# Patient Record
Sex: MALE | Race: WHITE | HISPANIC OR LATINO | URBAN - METROPOLITAN AREA
[De-identification: names, ages, dates, MRNs, and addresses within clinical notes are randomized per-mention and may not be internally consistent; named-entity substitution may affect disease eponyms.]

---

## 2019-07-26 ENCOUNTER — EMERGENCY (EMERGENCY)
Facility: HOSPITAL | Age: 24
LOS: 1 days | Discharge: HOME | End: 2019-07-26
Payer: COMMERCIAL

## 2019-07-26 VITALS
OXYGEN SATURATION: 98 % | SYSTOLIC BLOOD PRESSURE: 133 MMHG | HEART RATE: 66 BPM | DIASTOLIC BLOOD PRESSURE: 72 MMHG | RESPIRATION RATE: 16 BRPM

## 2019-07-26 VITALS
SYSTOLIC BLOOD PRESSURE: 140 MMHG | WEIGHT: 175.05 LBS | TEMPERATURE: 98 F | HEIGHT: 72 IN | HEART RATE: 61 BPM | RESPIRATION RATE: 20 BRPM | OXYGEN SATURATION: 98 % | DIASTOLIC BLOOD PRESSURE: 77 MMHG

## 2019-07-26 PROCEDURE — 99283 EMERGENCY DEPT VISIT LOW MDM: CPT

## 2019-07-26 PROCEDURE — 73564 X-RAY EXAM KNEE 4 OR MORE: CPT

## 2019-07-26 PROCEDURE — 73564 X-RAY EXAM KNEE 4 OR MORE: CPT | Mod: 26,RT

## 2019-07-26 RX ORDER — ACETAMINOPHEN 500 MG
975 TABLET ORAL ONCE
Refills: 0 | Status: COMPLETED | OUTPATIENT
Start: 2019-07-26 | End: 2019-07-26

## 2019-07-26 RX ORDER — IBUPROFEN 200 MG
600 TABLET ORAL ONCE
Refills: 0 | Status: COMPLETED | OUTPATIENT
Start: 2019-07-26 | End: 2019-07-26

## 2019-07-26 RX ADMIN — Medication 975 MILLIGRAM(S): at 16:15

## 2019-07-26 RX ADMIN — Medication 600 MILLIGRAM(S): at 16:45

## 2019-07-26 RX ADMIN — Medication 600 MILLIGRAM(S): at 16:15

## 2019-07-26 RX ADMIN — Medication 975 MILLIGRAM(S): at 16:45

## 2019-07-26 NOTE — ED PROVIDER NOTE - NSFOLLOWUPCLINICS_GEN_ALL_ED_FT
[facility]  Sports Medicine  [address] [place] [address]  Phone: [phone]  Fax:   Follow Up Time:

## 2019-07-26 NOTE — ED PROVIDER NOTE - ATTENDING CONTRIBUTION TO CARE
MD Egan:  patient seen and evaluated with the resident.  I was present for key portions of the History & Physical, and I agree with the Impression & Plan.  MD Egan:  24 yo M, c/o R knee pain.  Onset: yesterday.  Context:  inversion/twist mechanism.  Location of pain: lateral joint line.  Pain worse with ambulation  Physical Exam: +effusion, no ligamentous laxity.  ROM limited in extremes of flexion and extension  Impression:  lateral meniscus vs lateral meniscus injury with small traumatic effusion.  Plan: d/c home, ace wrap, NSAIDs, WBAT, f/u ortho/sport.

## 2019-07-26 NOTE — ED ADULT NURSE NOTE - NSIMPLEMENTINTERV_GEN_ALL_ED
Implemented All Universal Safety Interventions:  [place] to call system. Call bell, personal items and telephone within reach. Instruct patient to call for assistance. Room bathroom lighting operational. Non-slip footwear when patient is off stretcher. Physically safe environment: no spills, clutter or unnecessary equipment. Stretcher in lowest position, wheels locked, appropriate side rails in place.

## 2019-07-26 NOTE — ED ADULT NURSE NOTE - OBJECTIVE STATEMENT
22 y/o male seen in [facility] c/o right knee injury about 22:00 last night playing soccer. Bumped into another player and knee bent inwards.  Pt c/o pain and swelling since injury. Ambulatory but with s pain.  No fever/chills or skin changes. No head injury.  Rt knee no redness, pain with movement.  Lacey cute respiratory distress noted. 24 y/o male seen in [facility] c/o right knee injury about 22:00 last night playing soccer. Bumped into another player and knee bent inwards.  Pt c/o pain and swelling since injury. Ambulatory but with s pain.  No fever/chills or skin changes. No head injury.  Rt knee no redness, pain with movement.  No acute respiratory distress noted.

## 2019-07-26 NOTE — ED PROVIDER NOTE - CLINICAL SUMMARY MEDICAL DECISION MAKING FREE TEXT BOX
[place] PGY2- R knee injury playing soccer last night, non contact, knee with bent inwards, hx of past lateral meniscal injury on affected side, no other injuries  ambulatory, but with significant pain, no fever/chills, otherwise feels well  +effusion, ttp laterally, pain with extremes on ROM, joint stable, distal neurovascular intact  XRAY, oral meds, ice, bulky underwood/crutches  sports med f/u  concern for meniscal/ligamentous injury

## 2019-07-26 NOTE — ED PROVIDER NOTE - OBJECTIVE STATEMENT
23 yoM, healthy presents to ED with complaint of R knee injury around 22:00 last night playing soccer. Bumped into another player and knee bent inwards. Now with pain to lateral portion of knee. Hx of prior lateral meniscal injury on that side. Reports significant pain and swelling since injury. Ambulatory but with significant pain. Has not taken any oral medications at home. No fever/chills or skin changes. Otherwise feels well. No AC medications. No other injuries from incident.

## 2019-07-26 NOTE — ED PROVIDER NOTE - PHYSICAL EXAMINATION
PHYSICAL EXAM:  GENERAL: NAD, well-groomed, well-developed  HEAD:  Atraumatic, Normocephalic  EYES: EOMI, PERRLA, conjunctiva and sclera clear  ENMT: No tonsillar erythema, exudates, or enlargement; Moist mucous membranes  NECK: Supple, No JVD  HEART: Regular rate and rhythm; No murmurs, rubs, or gallops  RESPIRATORY: CTA B/L, No W/R/R  ABDOMEN: Soft, Nontender, Nondistended  NEURO: A&Ox3, nonfocal, moving all extremities  EXTREMITIES:  2+ Peripheral Pulses, No clubbing, cyanosis, or edema, R knee with effusion, tender laterally, +mild pain with varus force, pain with ROM in the extremes, skin intact, no erythema, femur without ttp, no ttp to lower leg/foot, distal neurovascular intact, LLE unremarkable   SKIN: warm, dry, normal color, no rash

## 2019-07-26 NOTE — ED PROVIDER NOTE - NSFOLLOWUPINSTRUCTIONS_ED_ALL_ED_FT
You have a knee injury.    The XRAY was negative for fracture.    You still need further evaluation in case of meniscal or ligamentous injury.    Follow up with sports medicine.    Try and keep the dressing on for 2 days if possible and use crutches as needed.    Take IBUPROFEN as needed, 600 mg every 8 hours.    Return to ER for new or concerning symptoms.

## 2019-07-30 ENCOUNTER — APPOINTMENT (OUTPATIENT)
Age: 24
End: 2019-07-30
Payer: COMMERCIAL

## 2019-07-30 DIAGNOSIS — S89.91XA UNSPECIFIED INJURY OF RIGHT LOWER LEG, INITIAL ENCOUNTER: ICD-10-CM

## 2019-07-30 DIAGNOSIS — M25.461 EFFUSION, RIGHT KNEE: ICD-10-CM

## 2019-07-30 DIAGNOSIS — M25.561 PAIN IN RIGHT KNEE: ICD-10-CM

## 2019-07-30 PROCEDURE — 76882 US LMTD JT/FCL EVL NVASC XTR: CPT

## 2019-07-30 PROCEDURE — 99205 OFFICE O/P NEW HI 60 MIN: CPT | Mod: 25

## 2019-07-30 NOTE — HISTORY OF PRESENT ILLNESS
[de-identified] : Attending note. A new patient visit for this 23-year-old male who injured his right knee while playing soccer on July 25. Patient sustained a collision with direct blow to the lateral aspect of his right knee. Patient experienced immediate pain and was not able to continue playing. Patient had significant swelling of his right knee the next day with feeling of instability. Patient reports pain on the lateral aspect of his knee which is exacerbated by weightbearing. He denies any numbness or paresthesia. Patient had meniscus repair of the right knee several years ago. He is occasionally taking NSAIDs. He denies any past medical history and has no allergies

## 2019-07-30 NOTE — PHYSICAL EXAM
[de-identified] : Attending note. Patient is alert and in no acute distress. Examination of the right knee reveals a small effusion. There is no tenderness of the quad, quad tendon, patella, patellar tendon or tibial tuberosity. There is tenderness over the lateral knee along the LCL. This is exacerbated with varus stress of the knee. There is no medial knee tenderness. There is slight tenderness in the popliteal fossa. Patient is unable to fully extend his knee due to pain and is able to flex his knee to approximately 90° which is limited by pain. Lachman test is negative. Anterior drawer is negative. Unable to assessment Parra. Skin is normal. Sensation is normal. Distal pulses are intact. There is no leg edema.

## 2019-07-30 NOTE — PROCEDURE
[de-identified] : Attending note. Point of care ultrasound was performed of the right knee today in the office which shows a moderate size right knee effusion. There is also edema around the insertion of the LCL. During dynamic ultrasound there is slight opening of the lateral joint line with varus stress.

## 2019-07-30 NOTE — DISCUSSION/SUMMARY
[de-identified] : Attending note. Impression: #1 acute right knee injury, #2 right lateral knee pain, #3 right knee effusion, #4 LCL sprain of the right knee, #5 instability of the right knee. Due to the mechanism of injury and development of an acute right knee effusion and feeling of instability concern for internal derangement in particular ACL sprain. She was provided with a prescription for an MRI of his right knee. He understands and will return to the office after completion of the MRI of his right knee to review findings and discuss management plan as well as reevaluation of his physical exam. Patient was provided with a right hinged knee wrap. He will return to the office next week. He may continue using NSAIDs.

## 2019-08-02 ENCOUNTER — FORM ENCOUNTER (OUTPATIENT)
Age: 24
End: 2019-08-02

## 2019-08-03 ENCOUNTER — OUTPATIENT (OUTPATIENT)
Dept: OUTPATIENT SERVICES | Facility: HOSPITAL | Age: 24
LOS: 1 days | End: 2019-08-03
Payer: COMMERCIAL

## 2019-08-03 ENCOUNTER — APPOINTMENT (OUTPATIENT)
Dept: MRI IMAGING | Facility: CLINIC | Age: 24
End: 2019-08-03
Payer: COMMERCIAL

## 2019-08-03 DIAGNOSIS — Z00.8 ENCOUNTER FOR OTHER GENERAL EXAMINATION: ICD-10-CM

## 2019-08-03 PROCEDURE — 73721 MRI JNT OF LWR EXTRE W/O DYE: CPT

## 2019-08-03 PROCEDURE — 73721 MRI JNT OF LWR EXTRE W/O DYE: CPT | Mod: 26,RT

## 2019-08-06 ENCOUNTER — APPOINTMENT (OUTPATIENT)
Dept: SPORTS MEDICINE | Facility: CLINIC | Age: 24
End: 2019-08-06
Payer: COMMERCIAL

## 2019-08-06 DIAGNOSIS — S83.289A OTHER TEAR OF LATERAL MENISCUS, CURRENT INJURY, UNSPECIFIED KNEE, INITIAL ENCOUNTER: ICD-10-CM

## 2019-08-06 DIAGNOSIS — S83.519A SPRAIN OF ANTERIOR CRUCIATE LIGAMENT OF UNSPECIFIED KNEE, INITIAL ENCOUNTER: ICD-10-CM

## 2019-08-06 PROCEDURE — 99214 OFFICE O/P EST MOD 30 MIN: CPT

## 2019-08-06 NOTE — PHYSICAL EXAM
[Other: ___] : [unfilled] [UE/LE] : Sensory: Intact in bilateral upper & lower extremities [ALL] : dorsalis pedis, posterior tibial, femoral, popliteal, and radial 2+ and symmetric bilaterally [2+] : left 2+ [Normal] : Normal bowel sounds, soft, non-tender, no hepato-splenomegaly and no abdominal mass palpated [Poor Appearance] : well-appearing [Acute Distress] : not in acute distress [de-identified] : MRI performed outpt with impression of Right ACL full-thickness tear, R ligamental lateral sprain, right lateral meniscus tear

## 2019-08-06 NOTE — REVIEW OF SYSTEMS
[Negative] : Heme/Lymph [Arthralgia] : no arthralgia [Joint Stiffness] : joint stiffness [Joint Pain] : joint pain [Joint Swelling] : no joint swelling [FreeTextEntry9] : R knee pain

## 2019-08-06 NOTE — ADDENDUM
[FreeTextEntry1] : Explained MRI results with patient and had lengthy conversation in regards to options for treatment. Patient opted to have PT for now and will followup in 4 weeks to reevaluate. He understands surgery may be likely due to the ACL and meniscus tear.\par PT referral given. Will reevaluate in 4 weeks and discuss further treatment at that time. \par Patient ambulating, pain controlled, precautions and earlier followup if needed understood by patient.

## 2019-08-06 NOTE — DISCUSSION/SUMMARY
[de-identified] : Attn - right ACL tear, lateral meniscus tear, LCL sprain.  pt advised to f/u with orthopedic surgery for ACL repair, however, pt is not interested in surgery at this time and will attempt PT and activity modification. Pt will f/u in 4 weeks.  continue hinged knee brace.

## 2019-08-06 NOTE — PHYSICAL EXAM
[Other: ___] : [unfilled] [UE/LE] : Sensory: Intact in bilateral upper & lower extremities [ALL] : dorsalis pedis, posterior tibial, femoral, popliteal, and radial 2+ and symmetric bilaterally [2+] : left 2+ [Normal] : No costovertebral angle tenderness and no spinal tenderness [Poor Appearance] : well-appearing [Acute Distress] : not in acute distress [de-identified] : MRI performed outpt with impression of Right ACL full-thickness tear, R ligamental lateral sprain, right lateral meniscus tear

## 2019-08-06 NOTE — REVIEW OF SYSTEMS
[Negative] : Heme/Lymph [Arthralgia] : no arthralgia [Joint Stiffness] : joint stiffness [Joint Pain] : joint pain [FreeTextEntry9] : R knee pain [Joint Swelling] : no joint swelling

## 2019-08-06 NOTE — DISCUSSION/SUMMARY
[de-identified] : Attn - right ACL tear, lateral meniscus tear, LCL sprain.  pt advised to f/u with orthopedic surgery for ACL repair, however, pt is not interested in surgery at this time and will attempt PT and activity modification. Pt will f/u in 4 weeks.  continue hinged knee brace.

## 2019-09-04 ENCOUNTER — APPOINTMENT (OUTPATIENT)
Dept: ORTHOPEDIC SURGERY | Facility: CLINIC | Age: 24
End: 2019-09-04
Payer: COMMERCIAL

## 2019-09-04 VITALS — DIASTOLIC BLOOD PRESSURE: 88 MMHG | SYSTOLIC BLOOD PRESSURE: 163 MMHG | HEART RATE: 55 BPM

## 2019-09-04 DIAGNOSIS — S83.511D SPRAIN OF ANTERIOR CRUCIATE LIGAMENT OF RIGHT KNEE, SUBSEQUENT ENCOUNTER: ICD-10-CM

## 2019-09-04 DIAGNOSIS — S83.281D OTHER TEAR OF LATERAL MENISCUS, CURRENT INJURY, RIGHT KNEE, SUBSEQUENT ENCOUNTER: ICD-10-CM

## 2019-09-04 PROCEDURE — 99203 OFFICE O/P NEW LOW 30 MIN: CPT

## 2019-09-17 ENCOUNTER — APPOINTMENT (OUTPATIENT)
Dept: SPORTS MEDICINE | Facility: CLINIC | Age: 24
End: 2019-09-17

## 2019-09-20 ENCOUNTER — OUTPATIENT (OUTPATIENT)
Dept: OUTPATIENT SERVICES | Facility: HOSPITAL | Age: 24
LOS: 1 days | End: 2019-09-20
Payer: COMMERCIAL

## 2019-09-20 VITALS
DIASTOLIC BLOOD PRESSURE: 90 MMHG | OXYGEN SATURATION: 99 % | TEMPERATURE: 97 F | HEIGHT: 71 IN | HEART RATE: 61 BPM | WEIGHT: 177.03 LBS | SYSTOLIC BLOOD PRESSURE: 146 MMHG | RESPIRATION RATE: 16 BRPM

## 2019-09-20 DIAGNOSIS — S83.281D OTHER TEAR OF LATERAL MENISCUS, CURRENT INJURY, RIGHT KNEE, SUBSEQUENT ENCOUNTER: ICD-10-CM

## 2019-09-20 DIAGNOSIS — Z98.890 OTHER SPECIFIED POSTPROCEDURAL STATES: Chronic | ICD-10-CM

## 2019-09-20 DIAGNOSIS — S89.90XA UNSPECIFIED INJURY OF UNSPECIFIED LOWER LEG, INITIAL ENCOUNTER: ICD-10-CM

## 2019-09-20 DIAGNOSIS — R03.0 ELEVATED BLOOD-PRESSURE READING, WITHOUT DIAGNOSIS OF HYPERTENSION: ICD-10-CM

## 2019-09-20 LAB
ALBUMIN SERPL ELPH-MCNC: 4.9 G/DL — SIGNIFICANT CHANGE UP (ref 3.3–5)
ALP SERPL-CCNC: 72 U/L — SIGNIFICANT CHANGE UP (ref 40–120)
ALT FLD-CCNC: 16 U/L — SIGNIFICANT CHANGE UP (ref 4–41)
ANION GAP SERPL CALC-SCNC: 15 MMO/L — HIGH (ref 7–14)
AST SERPL-CCNC: 17 U/L — SIGNIFICANT CHANGE UP (ref 4–40)
BILIRUB SERPL-MCNC: 0.3 MG/DL — SIGNIFICANT CHANGE UP (ref 0.2–1.2)
BUN SERPL-MCNC: 12 MG/DL — SIGNIFICANT CHANGE UP (ref 7–23)
CALCIUM SERPL-MCNC: 9.4 MG/DL — SIGNIFICANT CHANGE UP (ref 8.4–10.5)
CHLORIDE SERPL-SCNC: 102 MMOL/L — SIGNIFICANT CHANGE UP (ref 98–107)
CO2 SERPL-SCNC: 25 MMOL/L — SIGNIFICANT CHANGE UP (ref 22–31)
CREAT SERPL-MCNC: 0.93 MG/DL — SIGNIFICANT CHANGE UP (ref 0.5–1.3)
GLUCOSE SERPL-MCNC: 66 MG/DL — LOW (ref 70–99)
HCT VFR BLD CALC: 47.6 % — SIGNIFICANT CHANGE UP (ref 39–50)
HGB BLD-MCNC: 15.9 G/DL — SIGNIFICANT CHANGE UP (ref 13–17)
MCHC RBC-ENTMCNC: 30.5 PG — SIGNIFICANT CHANGE UP (ref 27–34)
MCHC RBC-ENTMCNC: 33.4 % — SIGNIFICANT CHANGE UP (ref 32–36)
MCV RBC AUTO: 91.4 FL — SIGNIFICANT CHANGE UP (ref 80–100)
NRBC # FLD: 0 K/UL — SIGNIFICANT CHANGE UP (ref 0–0)
PLATELET # BLD AUTO: 298 K/UL — SIGNIFICANT CHANGE UP (ref 150–400)
PMV BLD: 10.2 FL — SIGNIFICANT CHANGE UP (ref 7–13)
POTASSIUM SERPL-MCNC: 3.6 MMOL/L — SIGNIFICANT CHANGE UP (ref 3.5–5.3)
POTASSIUM SERPL-SCNC: 3.6 MMOL/L — SIGNIFICANT CHANGE UP (ref 3.5–5.3)
PROT SERPL-MCNC: 8 G/DL — SIGNIFICANT CHANGE UP (ref 6–8.3)
RBC # BLD: 5.21 M/UL — SIGNIFICANT CHANGE UP (ref 4.2–5.8)
RBC # FLD: 12.3 % — SIGNIFICANT CHANGE UP (ref 10.3–14.5)
SODIUM SERPL-SCNC: 142 MMOL/L — SIGNIFICANT CHANGE UP (ref 135–145)
WBC # BLD: 8.29 K/UL — SIGNIFICANT CHANGE UP (ref 3.8–10.5)
WBC # FLD AUTO: 8.29 K/UL — SIGNIFICANT CHANGE UP (ref 3.8–10.5)

## 2019-09-20 PROCEDURE — 93010 ELECTROCARDIOGRAM REPORT: CPT

## 2019-09-20 NOTE — H&P PST ADULT - NSICDXPASTMEDICALHX_GEN_ALL_CORE_FT
PAST MEDICAL HISTORY:  No pertinent past medical history PAST MEDICAL HISTORY:  Alcohol abuse admits to 8 beers 2x/wk    Knee injury     No pertinent past medical history PAST MEDICAL HISTORY:  Alcohol abuse admits to 8 beers 2x/wk    Knee injury

## 2019-09-20 NOTE — H&P PST ADULT - HISTORY OF PRESENT ILLNESS
23 y/o male with hx of right knee injury Aug. 2019 while playing soccer.  Pt reports lateral knee pain persists with certain movements. MRI was done, dx with  meniscus tear, and ACL tear (R) knee.  Scheduled for right knee arthrosoc[py ACL reconstruction 10/10/19.

## 2019-09-20 NOTE — H&P PST ADULT - ASSESSMENT
23 y/o male with hx of right knee injury Aug. 2019 while playing soccer.  Pt reports lateral knee pain persists with certain movements. MRI was done, dx with  meniscus tear, and ACL tear (R) knee.  Scheduled for right knee arthroscopy ACL reconstruction 10/10/19.

## 2019-09-20 NOTE — H&P PST ADULT - NSICDXPROBLEM_GEN_ALL_CORE_FT
PROBLEM DIAGNOSES  Problem: Knee injury  Assessment and Plan: right knee arthroscopy ACL reconstruction 10/10/19.  CBC CMP EKG  Preop instructions and antibacterial soap given and explained (verbal and written), with teach back.     Problem: Elevated BP without diagnosis of hypertension  Assessment and Plan: No PMD.  Pt has appt with Dr. Allen for evaluation of /90 at PST

## 2019-09-20 NOTE — H&P PST ADULT - MUSCULOSKELETAL COMMENTS
25 y/o male with hx of right knee injury Aug. 2019 while playing soccer.  Pt reports lateral knee pain persists with certain movements. MRI was done, dx with  meniscus tear, and ACL tear (R) knee.  Scheduled for right knee arthrosoc[py ACL reconstruction 10/10/19. hx of right knee injury Aug. 2019 while playing soccer.  Pt reports lateral knee pain persists with certain movements. MRI was done, dx with  meniscus tear, and ACL tear (R) knee.  Scheduled for right knee arthrosoc[py ACL reconstruction 10/10/19.

## 2019-10-10 ENCOUNTER — OUTPATIENT (OUTPATIENT)
Dept: OUTPATIENT SERVICES | Facility: HOSPITAL | Age: 24
LOS: 1 days | Discharge: HOME | End: 2019-10-10
Payer: COMMERCIAL

## 2019-10-10 ENCOUNTER — APPOINTMENT (OUTPATIENT)
Dept: ORTHOPEDIC SURGERY | Facility: AMBULATORY SURGERY CENTER | Age: 24
End: 2019-10-10

## 2019-10-10 VITALS
OXYGEN SATURATION: 99 % | SYSTOLIC BLOOD PRESSURE: 157 MMHG | RESPIRATION RATE: 12 BRPM | DIASTOLIC BLOOD PRESSURE: 94 MMHG | HEART RATE: 57 BPM

## 2019-10-10 VITALS
HEART RATE: 55 BPM | TEMPERATURE: 98 F | WEIGHT: 177.03 LBS | SYSTOLIC BLOOD PRESSURE: 133 MMHG | OXYGEN SATURATION: 100 % | RESPIRATION RATE: 18 BRPM | DIASTOLIC BLOOD PRESSURE: 80 MMHG | HEIGHT: 71 IN

## 2019-10-10 DIAGNOSIS — Z98.890 OTHER SPECIFIED POSTPROCEDURAL STATES: Chronic | ICD-10-CM

## 2019-10-10 DIAGNOSIS — S83.281D OTHER TEAR OF LATERAL MENISCUS, CURRENT INJURY, RIGHT KNEE, SUBSEQUENT ENCOUNTER: ICD-10-CM

## 2019-10-10 PROCEDURE — 29881 ARTHRS KNE SRG MNISECTMY M/L: CPT | Mod: RT

## 2019-10-10 PROCEDURE — 29888 ARTHRS AID ACL RPR/AGMNTJ: CPT | Mod: RT

## 2019-10-10 RX ORDER — ASPIRIN/CALCIUM CARB/MAGNESIUM 324 MG
1 TABLET ORAL
Qty: 30 | Refills: 0
Start: 2019-10-10 | End: 2019-11-08

## 2019-10-10 NOTE — ASU DISCHARGE PLAN (ADULT/PEDIATRIC) - CALL YOUR DOCTOR IF YOU HAVE ANY OF THE FOLLOWING:
Numbness, tingling, color or temperature change to extremity/Bleeding that does not stop/Wound/Surgical Site with redness, or foul smelling discharge or pus/Pain not relieved by Medications Pain not relieved by Medications/Swelling that gets worse/Bleeding that does not stop/Nausea and vomiting that does not stop/Wound/Surgical Site with redness, or foul smelling discharge or pus/Numbness, tingling, color or temperature change to extremity/Inability to tolerate liquids or foods/Fever greater than (need to indicate Fahrenheit or Celsius)

## 2019-10-10 NOTE — ASU DISCHARGE PLAN (ADULT/PEDIATRIC) - ACTIVITY LEVEL
No heavy lifting/No excercise/No sports/gym No sports/gym/Elevate extremity/No excercise/ice knee 20 minutes at time, use crutches/No heavy lifting/Weight bearing as tolerated

## 2019-10-10 NOTE — ASU DISCHARGE PLAN (ADULT/PEDIATRIC) - PAIN MANAGEMENT
Prescription called to pharmacy narcotic pain medicine is constipating,buy over the counter stool softener and take as instructed on the bottle Do not mix Percocet with Tylenol (acetaminophen) as it already contains Tylenol. NO TYLENOL OR PEROCECET UNTIL10:30PM/Prescription called to pharmacy narcotic pain medicine is constipating,buy over the counter stool softener and take as instructed on the bottle Do not mix Percocet with Tylenol (acetaminophen) as it already contains Tylenol. NO TYLENOL OR PERCOCET UNTIL10:30PM/Prescription called to pharmacy

## 2019-10-10 NOTE — ASU DISCHARGE PLAN (ADULT/PEDIATRIC) - CARE PROVIDER_API CALL
Kwaku Mathur)  Orthopaedic Sports Medicine; Orthopaedic Surgery  [address] [place] [address]  Phone: [phone]  Fax: [phone]  Follow Up Time: Kwaku Mathur)  Orthopaedic Sports Medicine; Orthopaedic Surgery  [address] [facility] [address]  [place] [address]  Phone: [phone]  Fax: [phone]  Follow Up Time: 1 week

## 2019-10-14 PROBLEM — F10.10 ALCOHOL ABUSE, UNCOMPLICATED: Chronic | Status: ACTIVE | Noted: 2019-09-20

## 2019-10-14 PROBLEM — S89.90XA UNSPECIFIED INJURY OF UNSPECIFIED LOWER LEG, INITIAL ENCOUNTER: Chronic | Status: ACTIVE | Noted: 2019-09-20

## 2019-10-18 ENCOUNTER — APPOINTMENT (OUTPATIENT)
Dept: ORTHOPEDIC SURGERY | Facility: CLINIC | Age: 24
End: 2019-10-18
Payer: COMMERCIAL

## 2019-10-18 PROCEDURE — 99024 POSTOP FOLLOW-UP VISIT: CPT

## 2019-10-24 ENCOUNTER — APPOINTMENT (OUTPATIENT)
Dept: ORTHOPEDIC SURGERY | Facility: CLINIC | Age: 24
End: 2019-10-24

## 2019-11-21 ENCOUNTER — APPOINTMENT (OUTPATIENT)
Dept: ORTHOPEDIC SURGERY | Facility: CLINIC | Age: 24
End: 2019-11-21
Payer: COMMERCIAL

## 2019-11-21 PROCEDURE — 99024 POSTOP FOLLOW-UP VISIT: CPT

## 2020-01-03 ENCOUNTER — APPOINTMENT (OUTPATIENT)
Dept: ORTHOPEDIC SURGERY | Facility: CLINIC | Age: 25
End: 2020-01-03
Payer: COMMERCIAL

## 2020-01-03 DIAGNOSIS — Z98.890 OTHER SPECIFIED POSTPROCEDURAL STATES: ICD-10-CM

## 2020-01-03 PROCEDURE — 99024 POSTOP FOLLOW-UP VISIT: CPT

## 2020-04-03 ENCOUNTER — APPOINTMENT (OUTPATIENT)
Dept: ORTHOPEDIC SURGERY | Facility: CLINIC | Age: 25
End: 2020-04-03

## 2020-04-16 ENCOUNTER — APPOINTMENT (OUTPATIENT)
Dept: ORTHOPEDIC SURGERY | Facility: CLINIC | Age: 25
End: 2020-04-16

## 2022-06-14 NOTE — H&P PST ADULT - DOES PATIENT MEET CRITERIA FOR SEPSIS
Goal Outcome Evaluation:  Plan of Care Reviewed With: patient        Progress: no change  Outcome Evaluation: Pt agreeable to PT and able to maintain activity level. Pt continues to ambulate well with straight cane and CGA. No new PT concerns; no problems anticipated with DC home when medically stable.   No

## 2023-04-13 NOTE — H&P PST ADULT - RESPIRATORY RATE (BREATHS/MIN)
Methotrexate Pregnancy And Lactation Text: This medication is Pregnancy Category X and is known to cause fetal harm. This medication is excreted in breast milk. 16

## 2023-12-15 NOTE — H&P PST ADULT - OCCUPATION
The following findings require follow up:  Radiographic finding    Findings: Enlarged prostate and calcifications, enlarged ascending thoracic aorta, abnormality of bursa of the hip, Groundglass opacity of the lungs    Follow up required: CT chest 1 year , follow up with urology, follow up with PCP     Follow up should be done within 1 month(s)    Please notify the following clinician to assist with the follow up:   Dr. Maldonado   works with [employer]

## 2024-04-28 ENCOUNTER — NON-APPOINTMENT (OUTPATIENT)
Age: 29
End: 2024-04-28

## 2024-05-01 ENCOUNTER — APPOINTMENT (OUTPATIENT)
Dept: ORTHOPEDIC SURGERY | Facility: CLINIC | Age: 29
End: 2024-05-01
Payer: COMMERCIAL

## 2024-05-01 VITALS
TEMPERATURE: 97.8 F | BODY MASS INDEX: 26.32 KG/M2 | WEIGHT: 188 LBS | HEART RATE: 87 BPM | DIASTOLIC BLOOD PRESSURE: 84 MMHG | OXYGEN SATURATION: 97 % | SYSTOLIC BLOOD PRESSURE: 146 MMHG | HEIGHT: 71 IN

## 2024-05-01 DIAGNOSIS — M89.8X9 OTHER SPECIFIED DISORDERS OF BONE, UNSPECIFIED SITE: ICD-10-CM

## 2024-05-01 DIAGNOSIS — S76.212A STRAIN OF ADDUCTOR MUSCLE, FASCIA AND TENDON OF LEFT THIGH, INITIAL ENCOUNTER: ICD-10-CM

## 2024-05-01 DIAGNOSIS — M25.552 PAIN IN LEFT HIP: ICD-10-CM

## 2024-05-01 PROCEDURE — 72170 X-RAY EXAM OF PELVIS: CPT | Mod: LT

## 2024-05-01 PROCEDURE — 73501 X-RAY EXAM HIP UNI 1 VIEW: CPT

## 2024-05-01 PROCEDURE — 99203 OFFICE O/P NEW LOW 30 MIN: CPT

## 2024-05-02 ENCOUNTER — APPOINTMENT (OUTPATIENT)
Dept: ORTHOPEDIC SURGERY | Facility: CLINIC | Age: 29
End: 2024-05-02

## 2024-05-09 ENCOUNTER — APPOINTMENT (OUTPATIENT)
Dept: MRI IMAGING | Facility: CLINIC | Age: 29
End: 2024-05-09
Payer: COMMERCIAL

## 2024-05-09 ENCOUNTER — OUTPATIENT (OUTPATIENT)
Dept: OUTPATIENT SERVICES | Facility: HOSPITAL | Age: 29
LOS: 1 days | End: 2024-05-09
Payer: COMMERCIAL

## 2024-05-09 DIAGNOSIS — Z98.890 OTHER SPECIFIED POSTPROCEDURAL STATES: Chronic | ICD-10-CM

## 2024-05-09 DIAGNOSIS — S76.212A STRAIN OF ADDUCTOR MUSCLE, FASCIA AND TENDON OF LEFT THIGH, INITIAL ENCOUNTER: ICD-10-CM

## 2024-05-09 DIAGNOSIS — M25.552 PAIN IN LEFT HIP: ICD-10-CM

## 2024-05-09 DIAGNOSIS — M89.8X9 OTHER SPECIFIED DISORDERS OF BONE, UNSPECIFIED SITE: ICD-10-CM

## 2024-05-09 PROCEDURE — 73721 MRI JNT OF LWR EXTRE W/O DYE: CPT

## 2024-05-09 PROCEDURE — 73721 MRI JNT OF LWR EXTRE W/O DYE: CPT | Mod: 26,LT

## 2024-05-15 ENCOUNTER — APPOINTMENT (OUTPATIENT)
Dept: ORTHOPEDIC SURGERY | Facility: CLINIC | Age: 29
End: 2024-05-15
Payer: COMMERCIAL

## 2024-05-15 VITALS
HEART RATE: 48 BPM | HEIGHT: 71 IN | SYSTOLIC BLOOD PRESSURE: 126 MMHG | WEIGHT: 188 LBS | BODY MASS INDEX: 26.32 KG/M2 | DIASTOLIC BLOOD PRESSURE: 77 MMHG

## 2024-05-15 DIAGNOSIS — S76.219A STRAIN OF ADDUCTOR MUSCLE, FASCIA AND TENDON OF UNSPECIFIED THIGH, INITIAL ENCOUNTER: ICD-10-CM

## 2024-05-15 PROCEDURE — 99213 OFFICE O/P EST LOW 20 MIN: CPT

## 2024-05-20 ENCOUNTER — APPOINTMENT (OUTPATIENT)
Dept: FAMILY MEDICINE | Facility: CLINIC | Age: 29
End: 2024-05-20

## 2024-06-26 ENCOUNTER — APPOINTMENT (OUTPATIENT)
Dept: ORTHOPEDIC SURGERY | Facility: CLINIC | Age: 29
End: 2024-06-26